# Patient Record
Sex: FEMALE | Race: WHITE | NOT HISPANIC OR LATINO | URBAN - METROPOLITAN AREA
[De-identification: names, ages, dates, MRNs, and addresses within clinical notes are randomized per-mention and may not be internally consistent; named-entity substitution may affect disease eponyms.]

---

## 2022-07-15 ENCOUNTER — OFFICE VISIT (OUTPATIENT)
Dept: URBAN - METROPOLITAN AREA CLINIC 92 | Facility: CLINIC | Age: 67
End: 2022-07-15
Payer: MEDICARE

## 2022-07-15 ENCOUNTER — LAB OUTSIDE AN ENCOUNTER (OUTPATIENT)
Dept: URBAN - METROPOLITAN AREA CLINIC 92 | Facility: CLINIC | Age: 67
End: 2022-07-15

## 2022-07-15 ENCOUNTER — TELEPHONE ENCOUNTER (OUTPATIENT)
Dept: URBAN - METROPOLITAN AREA CLINIC 92 | Facility: CLINIC | Age: 67
End: 2022-07-15

## 2022-07-15 ENCOUNTER — WEB ENCOUNTER (OUTPATIENT)
Dept: URBAN - METROPOLITAN AREA CLINIC 92 | Facility: CLINIC | Age: 67
End: 2022-07-15

## 2022-07-15 ENCOUNTER — DASHBOARD ENCOUNTERS (OUTPATIENT)
Age: 67
End: 2022-07-15

## 2022-07-15 VITALS
TEMPERATURE: 98.5 F | SYSTOLIC BLOOD PRESSURE: 120 MMHG | BODY MASS INDEX: 21.53 KG/M2 | HEIGHT: 62 IN | WEIGHT: 117 LBS | HEART RATE: 67 BPM | DIASTOLIC BLOOD PRESSURE: 67 MMHG

## 2022-07-15 DIAGNOSIS — R12 HEARTBURN: ICD-10-CM

## 2022-07-15 DIAGNOSIS — Z12.11 COLON CANCER SCREENING: ICD-10-CM

## 2022-07-15 PROCEDURE — 99204 OFFICE O/P NEW MOD 45 MIN: CPT | Performed by: INTERNAL MEDICINE

## 2022-07-15 RX ORDER — SODIUM, POTASSIUM,MAG SULFATES 17.5-3.13G
3000ML SOLUTION, RECONSTITUTED, ORAL ORAL
Qty: 1 BOTTLE | Refills: 0 | OUTPATIENT
Start: 2022-07-15

## 2022-07-15 NOTE — HPI-TODAY'S VISIT:
66-year-old female with a history of osteoporosis, who presents to discuss need for colonoscopy and heartburn.  Last colonoscopy -2012, paternal grandmother with colorectal cancer.  Denies any blood in the stool, 2-3 bowel movements per day.  Developed new heartburn in March 2022 after eating Mexican food and drinking of beer, stress.  Has been improving but still has episodes that are mild at times.  Sleep potato causes mild dysphagia.  Denies any weight changes or melena or family history of esophagus or stomach cancer.  Pepcid as needed helps, and she does not want to take any medicine regularly.

## 2022-09-06 ENCOUNTER — OFFICE VISIT (OUTPATIENT)
Dept: URBAN - METROPOLITAN AREA SURGERY CENTER 16 | Facility: SURGERY CENTER | Age: 67
End: 2022-09-06
Payer: MEDICARE

## 2022-09-06 DIAGNOSIS — R12 BURNING REFLUX: ICD-10-CM

## 2022-09-06 DIAGNOSIS — K20.90 ESOPHAGITIS: ICD-10-CM

## 2022-09-06 DIAGNOSIS — Z12.11 COLON CANCER SCREENING: ICD-10-CM

## 2022-09-06 PROCEDURE — G8907 PT DOC NO EVENTS ON DISCHARG: HCPCS | Performed by: INTERNAL MEDICINE

## 2022-09-06 PROCEDURE — G0121 COLON CA SCRN NOT HI RSK IND: HCPCS | Performed by: INTERNAL MEDICINE

## 2022-09-06 PROCEDURE — 43235 EGD DIAGNOSTIC BRUSH WASH: CPT | Performed by: INTERNAL MEDICINE

## 2022-09-06 RX ORDER — SODIUM, POTASSIUM,MAG SULFATES 17.5-3.13G
3000ML SOLUTION, RECONSTITUTED, ORAL ORAL
Qty: 1 BOTTLE | Refills: 0 | Status: ACTIVE | COMMUNITY
Start: 2022-07-15